# Patient Record
Sex: MALE | Race: WHITE | NOT HISPANIC OR LATINO | Employment: FULL TIME | URBAN - METROPOLITAN AREA
[De-identification: names, ages, dates, MRNs, and addresses within clinical notes are randomized per-mention and may not be internally consistent; named-entity substitution may affect disease eponyms.]

---

## 2017-03-08 ENCOUNTER — OFFICE VISIT (OUTPATIENT)
Dept: FAMILY MEDICINE | Facility: CLINIC | Age: 27
End: 2017-03-08
Payer: COMMERCIAL

## 2017-03-08 VITALS
DIASTOLIC BLOOD PRESSURE: 76 MMHG | SYSTOLIC BLOOD PRESSURE: 106 MMHG | BODY MASS INDEX: 37.47 KG/M2 | HEIGHT: 74 IN | WEIGHT: 292 LBS | HEART RATE: 60 BPM | TEMPERATURE: 98.7 F

## 2017-03-08 DIAGNOSIS — Z72.0 TOBACCO ABUSE: ICD-10-CM

## 2017-03-08 DIAGNOSIS — Z86.59 HISTORY OF DEPRESSION: ICD-10-CM

## 2017-03-08 DIAGNOSIS — K13.79 MASS OF MOUTH: ICD-10-CM

## 2017-03-08 DIAGNOSIS — E66.9 NON MORBID OBESITY, UNSPECIFIED OBESITY TYPE: ICD-10-CM

## 2017-03-08 DIAGNOSIS — Z00.01 ENCOUNTER FOR ROUTINE ADULT MEDICAL EXAM WITH ABNORMAL FINDINGS: Primary | ICD-10-CM

## 2017-03-08 PROCEDURE — 99395 PREV VISIT EST AGE 18-39: CPT | Performed by: PHYSICIAN ASSISTANT

## 2017-03-08 NOTE — PATIENT INSTRUCTIONS
Return for fasting labs when you are able.  Call to schedule an appointment with Oral Surgeon for you mouth mass.  Think about Wellbutrin/Zyban for smoking cessation.  Work on a healthy diet and regular exercise to bring your weight down and decrease your risk of developing diabetes.    CHRISTI Voss, PA-C    Preventive Health Recommendations  Male Ages 18 - 25     Yearly exam:             See your health care provider every year in order to  o   Review health changes.   o   Discuss preventive care.    o   Review your medicines if your doctor has prescribed any.    You should be tested each year for STDs (sexually transmitted diseases).     Talk to your provider about cholesterol testing.      If you are at risk for diabetes, you should have a diabetes test (fasting glucose).    Shots: Get a flu shot each year. Get a tetanus shot every 10 years.     Nutrition:    Eat at least 5 servings of fruits and vegetables daily.     Eat whole-grain bread, whole-wheat pasta and brown rice instead of white grains and rice.     Talk to your provider about calcium and Vitamin D.     Lifestyle    Exercise for at least 150 minutes a week (30 minutes a day, 5 days a week). This will help you control your weight and prevent disease.     Limit alcohol to one drink per day.     No smoking.     Wear sunscreen to prevent skin cancer.     See your dentist every six months for an exam and cleaning.

## 2017-03-08 NOTE — MR AVS SNAPSHOT
After Visit Summary   3/8/2017    Pierre Deleon    MRN: [id number]           Patient Information     Date Of Birth          1990        Visit Information        Provider Department      3/8/2017 11:20 AM Lidia Adorno PA-C [facility]        Today's Diagnoses     Encounter for routine adult medical exam with abnormal findings    -  1    History of depression        Tobacco abuse        Mass of mouth          Care Instructions    Return for fasting labs when you are able.  Call to schedule an appointment with Oral Surgeon for you mouth mass.  Think about Wellbutrin/Zyban for smoking cessation.  Work on a healthy diet and regular exercise to bring your weight down and decrease your risk of developing diabetes.    CHRISTI Voss PA-C    Preventive Health Recommendations  Male Ages 18 - 25     Yearly exam:             See your health care provider every year in order to  o   Review health changes.   o   Discuss preventive care.    o   Review your medicines if your doctor has prescribed any.    You should be tested each year for STDs (sexually transmitted diseases).     Talk to your provider about cholesterol testing.      If you are at risk for diabetes, you should have a diabetes test (fasting glucose).    Shots: Get a flu shot each year. Get a tetanus shot every 10 years.     Nutrition:    Eat at least 5 servings of fruits and vegetables daily.     Eat whole-grain bread, whole-wheat pasta and brown rice instead of white grains and rice.     Talk to your provider about calcium and Vitamin D.     Lifestyle    Exercise for at least 150 minutes a week (30 minutes a day, 5 days a week). This will help you control your weight and prevent disease.     Limit alcohol to one drink per day.     No smoking.     Wear sunscreen to prevent skin cancer.     See your dentist every six months for an exam and cleaning.           Follow-ups after your visit        Additional Services      ORAL SURGERY REFERRAL       Your provider has referred you to: [facility]  [phone], [facility] ([facility]) [phone], [facility]  [phone] and [facility] ([facility]) [phone]      Please be aware that coverage of these services is subject to the terms and limitations of your health insurance plan.  Call member services at your health plan with any benefit or coverage questions.      Please bring the following to your appointment:    >>   Any x-rays, CTs or MRIs which have been performed.  Contact the facility where they were done to arrange for  prior to your scheduled appointment.    >>   List of current medications   >>   This referral request   >>   Any documents/labs given to you for this referral                  Future tests that were ordered for you today     Open Future Orders        Priority Expected Expires Ordered    Lipid panel reflex to direct LDL Routine 4/8/2017 8/8/2017 3/8/2017    Glucose Routine 4/8/2017 8/8/2017 3/8/2017            Who to contact     If you have questions or need follow up information about today's clinic visit or your schedule please contact [facility] directly at [phone].  Normal or non-critical lab and imaging results will be communicated to you by "[employer]hart, letter or phone within 4 business days after the clinic has received the results. If you do not hear from us within 7 days, please contact the clinic through "[employer]hart or phone. If you have a critical or abnormal lab result, we will notify you by phone as soon as possible.  Submit refill requests through [employer] or call your pharmacy and they will forward the refill request to us. Please allow 3 business days for your refill to be completed.          Additional Information About Your Visit        [employer] Information     [employer] gives you secure access to your electronic  "health record. If you see a primary care provider, you can also send messages to your care team and make appointments. If you have questions, please call your primary care clinic.  If you do not have a primary care provider, please call [phone] and they will assist you.        Care EveryWhere ID     This is your Care EveryWhere ID. This could be used by other organizations to access your [place] medical records  [id number]        Your Vitals Were     Pulse Temperature Height BMI (Body Mass Index)          60 98.7  F (37.1  C) (Oral) 6' 1.75\" (1.873 m) 37.75 kg/m2         Blood Pressure from Last 3 Encounters:   03/08/17 106/76   11/09/15 122/70   06/18/15 124/66    Weight from Last 3 Encounters:   03/08/17 292 lb (132.5 kg)   11/09/15 271 lb (122.9 kg)   06/18/15 273 lb (123.8 kg)              We Performed the Following     DEPRESSION ACTION PLAN (DAP) Order [[id number]]     ORAL SURGERY REFERRAL        Primary Care Provider Office Phone # Fax #    Dinh Lopez PA-C [phone]       [address] [address]        Thank you!     Thank you for choosing [facility]  for your care. Our goal is always to provide you with excellent care. Hearing back from our patients is one way we can continue to improve our services. Please take a few minutes to complete the written survey that you may receive in the mail after your visit with us. Thank you!             Your Updated Medication List - Protect others around you: Learn how to safely use, store and throw away your medicines at www.disposemymeds.org.      Notice  As of 3/8/2017 11:52 AM    You have not been prescribed any medications.      "

## 2017-03-08 NOTE — NURSING NOTE
"Chief Complaint   Patient presents with     Physical       Initial /76 (Cuff Size: Adult Large)  Pulse 60  Temp 98.7  F (37.1  C) (Oral)  Ht 6' 1.75\" (1.873 m)  Wt 292 lb (132.5 kg)  BMI 37.75 kg/m2 Estimated body mass index is 37.75 kg/(m^2) as calculated from the following:    Height as of this encounter: 6' 1.75\" (1.873 m).    Weight as of this encounter: 292 lb (132.5 kg).  Medication Reconciliation: complete     Marlys Jay, Certified Medical Assistant (AAMA)     "

## 2017-03-08 NOTE — PROGRESS NOTES
SUBJECTIVE:     CC: Pierre Deleon is an 26 year old male who presents for preventative health visit.     Healthy Habits:    Do you get at least three servings of calcium containing foods daily (dairy, green leafy vegetables, etc.)? no, taking calcium and/or vitamin D supplement: no    Amount of exercise or daily activities, outside of work: none    Problems taking medications regularly not applicable    Medication side effects: No    Have you had an eye exam in the past two years? yes    Do you see a dentist twice per year? no    Do you have sleep apnea, excessive snoring or daytime drowsiness?yes    Lump on right side of upper lip.  Has had it for 4-5 years.  Has increased in size. Not painful, just there. Was referred to oral surgery 3-4 years ago, but didn't realize that he was supposed to call to schedule this.    History of depression, no recent issues.  -------------------------------------    Today's PHQ-2 Score:   PHQ-2 ( 1999 [employer]) 3/8/2017 3/8/2013   Q1: Little interest or pleasure in doing things 0 2   Q2: Feeling down, depressed or hopeless 0 0   PHQ-2 Score 0 2       Abuse: Current or Past(Physical, Sexual or Emotional)- No  Do you feel safe in your environment - Yes    Social History   Substance Use Topics     Smoking status: Current Every Day Smoker     Packs/day: 1.00     Types: Cigarettes     Smokeless tobacco: Never Used     Alcohol use No     doesn't drink    Last PSA: No results found for: PSA    Recent Labs   Lab Test  08/01/12   1046   CHOL  138   HDL  29*   LDL  80   TRIG  145   CHOLHDLRATIO  4.8       Reviewed orders with patient. Reviewed health maintenance and updated orders accordingly - Yes    Reviewed and updated as needed this visit by clinical staff  Tobacco  Allergies  Med Hx  Surg Hx  Fam Hx  Soc Hx        Reviewed and updated as needed this visit by Provider            ROS:  C: NEGATIVE for fever, chills, change in weight  I: NEGATIVE for worrisome rashes, moles or  "lesions  E: NEGATIVE for vision changes or irritation  ENT: as above  R: NEGATIVE for significant cough or SOB  CV: NEGATIVE for chest pain, palpitations or peripheral edema  GI: NEGATIVE for nausea, abdominal pain, heartburn, or change in bowel habits   male: negative for dysuria, hematuria, decreased urinary stream, erectile dysfunction, urethral discharge  M: NEGATIVE for significant arthralgias or myalgia  N: NEGATIVE for weakness, dizziness or paresthesias  P: NEGATIVE for changes in mood or affect    Problem list, Medication list, Allergies, and Medical/Social/Surgical histories reviewed in [facility] and updated as appropriate.  OBJECTIVE:     /76 (Cuff Size: Adult Large)  Pulse 60  Temp 98.7  F (37.1  C) (Oral)  Ht 6' 1.75\" (1.873 m)  Wt 292 lb (132.5 kg)  BMI 37.75 kg/m2  EXAM:  GENERAL: healthy, alert and no distress  EYES: Eyes grossly normal to inspection, PERRL and conjunctivae and sclerae normal  HENT: normal cephalic/atraumatic, ear canals and TM's normal, oropharynx clear, oral mucous membranes moist.  R upper lip, below nose with firm, circular 3-4 cm mass. This appears vascular in nature on gum side of mouth.  NECK: no adenopathy, no asymmetry, masses, or scars and thyroid normal to palpation  RESP: lungs clear to auscultation - no rales, rhonchi or wheezes  CV: regular rate and rhythm, normal S1 S2, no S3 or S4, no murmur, click or rub, no peripheral edema and peripheral pulses strong  ABDOMEN: soft, nontender, no hepatosplenomegaly, no masses and bowel sounds normal  MS: no gross musculoskeletal defects noted, no edema  SKIN: no suspicious lesions or rashes  NEURO: Normal strength and tone, mentation intact and speech normal  PSYCH: mentation appears normal, affect normal/bright    ASSESSMENT/PLAN:     1. Encounter for routine adult medical exam with abnormal findings  Follow up pending above results. Encouraged healthy diet and regular exercise, monthy JHONATAN, and yearly exams.  Strongly " "encouraged decrease in caffeine and energy drink consumption.  - Lipid panel reflex to direct LDL; Future  - Glucose; Future    2. History of depression  Stable, no new concerns.  - DEPRESSION ACTION PLAN (DAP) Order [[id number]]    3. Tobacco abuse  Encouraged smoking cessation, pt not interested at this time.    4. Mass of mouth  Increasing in size, pt is a smoker.  Strongly encouraged pt to call and schedule an appt for biopsy and/removal.  - ORAL SURGERY REFERRAL    5. Non-morbid obesity, unspecified obesity type  Recommended healthy diet and lifestyle changes.  Discussed options of nutrition referral, Lifestyle Program, Nutritional Weight and Wellness, Weight Watchers, Floored Pal, etc.    COUNSELING:  Reviewed preventive health counseling, as reflected in patient instructions         reports that he has been smoking Cigarettes.  He has been smoking about 1.00 pack per day. He has never used smokeless tobacco.    Estimated body mass index is 37.75 kg/(m^2) as calculated from the following:    Height as of this encounter: 6' 1.75\" (1.873 m).    Weight as of this encounter: 292 lb (132.5 kg).   Weight management plan: Discussed healthy diet and exercise guidelines and patient will follow up in 6 months in clinic to re-evaluate.    Counseling Resources:  ATP IV Guidelines  Pooled Cohorts Equation Calculator  FRAX Risk Assessment  ICSI Preventive Guidelines  Dietary Guidelines for Americans, 2010  USDA's MyPlate  ASA Prophylaxis  Lung CA Screening    Lidia Adorno PA-C  [facility]  "

## 2018-05-06 ENCOUNTER — HOSPITAL ENCOUNTER (EMERGENCY)
Facility: CLINIC | Age: 28
Discharge: HOME | End: 2018-05-06
Attending: NURSE PRACTITIONER | Admitting: NURSE PRACTITIONER
Payer: COMMERCIAL

## 2018-05-06 VITALS
SYSTOLIC BLOOD PRESSURE: 119 MMHG | BODY MASS INDEX: 31.02 KG/M2 | TEMPERATURE: 98 F | OXYGEN SATURATION: 99 % | RESPIRATION RATE: 16 BRPM | HEART RATE: 72 BPM | DIASTOLIC BLOOD PRESSURE: 69 MMHG | WEIGHT: 240 LBS

## 2018-05-06 DIAGNOSIS — L08.9 TOE INFECTION: ICD-10-CM

## 2018-05-06 DIAGNOSIS — L84 CALLUS OF FOOT: ICD-10-CM

## 2018-05-06 PROCEDURE — 99213 OFFICE O/P EST LOW 20 MIN: CPT | Mod: Z6 | Performed by: NURSE PRACTITIONER

## 2018-05-06 PROCEDURE — G0463 HOSPITAL OUTPT CLINIC VISIT: HCPCS | Performed by: NURSE PRACTITIONER

## 2018-05-06 RX ORDER — CEPHALEXIN 500 MG/1
500 CAPSULE ORAL 4 TIMES DAILY
Qty: 28 CAPSULE | Refills: 0 | Status: SHIPPED | OUTPATIENT
Start: 2018-05-06 | End: 2018-05-06

## 2018-05-06 RX ORDER — CEPHALEXIN 500 MG/1
500 CAPSULE ORAL 4 TIMES DAILY
Qty: 28 CAPSULE | Refills: 0 | Status: SHIPPED | OUTPATIENT
Start: 2018-05-06 | End: 2018-10-03

## 2018-05-06 NOTE — ED AVS SNAPSHOT
[facility] Emergency Department    [address] [address]    Phone:  [phone]    Fax:  [phone]                                       Pierre Deleon   MRN: [id number]    Department:  [facility] Emergency Department   Date of Visit:  5/6/2018           Patient Information     Date Of Birth          1990        Your diagnoses for this visit were:     Toe infection     Callus of foot        You were seen by Deysi Aguirre APRN CNP.      Follow-up Information     Schedule an appointment as soon as possible for a visit with [facility].    Specialty:  Podiatry    Contact information:    [address]  [address]  [facility] [address] [phone]        Discharge Instructions       Keflex 500 mg 4 times a day for 7 days.  Follow-up with podiatry.     24 Hour Appointment Hotline       To make an appointment at any [place] clinic, call [phone] ([phone]). If you don't have a family doctor or clinic, we will help you find one. [place] clinics are conveniently located to serve the needs of you and your family.             Review of your medicines      START taking        Dose / Directions Last dose taken    cephALEXin 500 MG capsule   Commonly known as:  KEFLEX   Dose:  500 mg   Quantity:  28 capsule        Take 1 capsule (500 mg) by mouth 4 times daily for 7 days   Refills:  0                Prescriptions were sent or printed at these locations (1 Prescription)                   [facility] [address] [place] [address] [address] [address] [facility] [address] [facility]   [address] [address]    Telephone:  [phone]   Fax:  [phone]   Hours:                  E-Prescribed (1 of 1)         cephALEXin (KEFLEX) 500 MG capsule                Orders Needing Specimen Collection     None      Pending Results     No orders found from 5/4/2018 to 5/7/2018.            Pending Culture Results     No orders found from 5/4/2018  to 5/7/2018.            Pending Results Instructions     If you had any lab results that were not finalized at the time of your Discharge, you can call the ED Lab Result RN at [phone]. You will be contacted by this team for any positive Lab results or changes in treatment. The nurses are available 7 days a week from 10A to 6:30P.  You can leave a message 24 hours per day and they will return your call.        Test Results From Your Hospital Stay               Thank you for choosing [facility]       Thank you for choosing [facility] for your care. Our goal is always to provide you with excellent care. Hearing back from our patients is one way we can continue to improve our services. Please take a few minutes to complete the written survey that you may receive in the mail after you visit with us. Thank you!        [employer] Information     [employer] gives you secure access to your electronic health record. If you see a primary care provider, you can also send messages to your care team and make appointments. If you have questions, please call your primary care clinic.  If you do not have a primary care provider, please call [phone] and they will assist you.        Care EveryWhere ID     This is your Care EveryWhere ID. This could be used by other organizations to access your [place] medical records  [id number]        Equal Access to Services     BENEDICTO HUNT : Lynne Petersen, aaliyah boone, qashawna kavinicio watt, madeline viveros. So [facility] [phone].    ATENCIÓN: Si habla español, tiene a harris disposición servicios gratuitos de asistencia lingüística. Llame al [phone].    We comply with applicable federal civil rights laws and [place] laws. We do not discriminate on the basis of race, color, national origin, age, disability, sex, sexual orientation, or gender identity.            After Visit Summary       This is your record. Keep this with you and show to your  community pharmacist(s) and doctor(s) at your next visit.

## 2018-05-06 NOTE — ED AVS SNAPSHOT
[facility] Emergency Department    [address] [address]    Phone:  [phone]    Fax:  [phone]                                       Pierre Deleon   MRN: [id number]    Department:  [facility] Emergency Department   Date of Visit:  5/6/2018           After Visit Summary Signature Page     I have received my discharge instructions, and my questions have been answered. I have discussed any challenges I see with this plan with the nurse or doctor.    ..........................................................................................................................................  Patient/Patient Representative Signature      ..........................................................................................................................................  Patient Representative Print Name and Relationship to Patient    ..................................................               ................................................  Date                                            Time    ..........................................................................................................................................  Reviewed by Signature/Title    ...................................................              ..............................................  Date                                                            Time

## 2018-05-06 NOTE — ED PROVIDER NOTES
History     Chief Complaint   Patient presents with     Foot Pain     R foot sore     HPI  Pierre Deleon is a 28 year old male Who presents to urgent care for evaluation of a callus on his left great toe.  Over the last 2 days the toe has been more sore and red.  Denies fever.  Denies any history of gout.    Problem List:    Patient Active Problem List    Diagnosis Date Noted     Tobacco abuse 03/08/2017     Priority: Medium     Non morbid obesity, unspecified obesity type 03/08/2017     Priority: Medium     History of depression 01/20/2015     Priority: Medium     CARDIOVASCULAR SCREENING; LDL GOAL LESS THAN 160      Priority: Medium     Cigarette smoker      Priority: Medium        Past Medical History:    Past Medical History:   Diagnosis Date     CARDIOVASCULAR SCREENING; LDL GOAL LESS THAN 160      Cigarette smoker      Depression        Past Surgical History:    Past Surgical History:   Procedure Laterality Date     NO HISTORY OF SURGERY         Family History:    Family History   Problem Relation Age of Onset     DIABETES Father      Coronary Artery Disease Father        Social History:  Marital Status:  Single [1]  Social History   Substance Use Topics     Smoking status: Current Every Day Smoker     Packs/day: 1.00     Types: Cigarettes     Smokeless tobacco: Never Used     Alcohol use No        Medications:      cephALEXin (KEFLEX) 500 MG capsule         Review of Systems  As mentioned above in the history present illness. All other systems were reviewed and are negative.    Physical Exam   BP: 119/69  Pulse: 72  Temp: 98  F (36.7  C)  Resp: 16  Weight: 108.9 kg (240 lb)  SpO2: 99 %      Physical Exam    GENERAL APPEARANCE: healthy, alert and no distress  SKIN: Medial aspect of the left great toe reveals a large corn/callus.  There is deep cracks in the callus.  There is faint erythema surrounding the callus.    ED Course     ED Course     Procedures               No results found for this or any previous  visit (from the past 24 hour(s)).    Medications - No data to display    Assessments & Plan (with Medical Decision Making)   Callus to the left great toe:  Follow-up with podiatry.    Warm soapy soaks and pummus stone.    Cellulitis probably early.  Start keflex   Return for worsening.  I have reviewed the nursing notes.    I have reviewed the findings, diagnosis, plan and need for follow up with the patient.      Discharge Medication List as of 5/6/2018  5:31 PM      START taking these medications    Details   cephALEXin (KEFLEX) 500 MG capsule Take 1 capsule (500 mg) by mouth 4 times daily for 7 days, Disp-28 capsule, R-0, E-Prescribe             Final diagnoses:   Toe infection   Callus of foot       5/6/2018   [facility] EMERGENCY DEPARTMENT     Deysi Aguirre APRN CNP  05/06/18 9922

## 2018-05-06 NOTE — ED NOTES
Pt here with right great toe pain- noticed a laceration 1 month ago. Pain became worse today. Callused area on great toe with several cracks noted.

## 2018-05-17 ENCOUNTER — OFFICE VISIT (OUTPATIENT)
Dept: PODIATRY | Facility: CLINIC | Age: 28
End: 2018-05-17
Payer: COMMERCIAL

## 2018-05-17 VITALS
BODY MASS INDEX: 30.8 KG/M2 | DIASTOLIC BLOOD PRESSURE: 60 MMHG | WEIGHT: 240 LBS | HEART RATE: 73 BPM | SYSTOLIC BLOOD PRESSURE: 119 MMHG | HEIGHT: 74 IN

## 2018-05-17 DIAGNOSIS — R23.4 FISSURE IN SKIN OF FOOT: Primary | ICD-10-CM

## 2018-05-17 PROCEDURE — 99203 OFFICE O/P NEW LOW 30 MIN: CPT | Mod: 25 | Performed by: PODIATRIST

## 2018-05-17 PROCEDURE — 11055 PARING/CUTG B9 HYPRKER LES 1: CPT | Performed by: PODIATRIST

## 2018-05-17 NOTE — PROGRESS NOTES
"PATIENT HISTORY:  Pierre Deleon is a 28 year old male who presents to clinic for a painful fissure in the skin of the right great toe.  The patient relates trying to trim the hard skin with little relief.  The patient denies any redness swelling or drainage.    REVIEW OF SYSTEMS:  Constitutional, HEENT, cardiovascular, pulmonary, GI, , musculoskeletal, neuro, skin, endocrine and psych systems are negative, except as otherwise noted.     PAST MEDICAL HISTORY:   Past Medical History:   Diagnosis Date     CARDIOVASCULAR SCREENING; LDL GOAL LESS THAN 160      Cigarette smoker      Depression         PAST SURGICAL HISTORY:   Past Surgical History:   Procedure Laterality Date     NO HISTORY OF SURGERY          MEDICATIONS:   Current Outpatient Prescriptions:      cephALEXin (KEFLEX) 500 MG capsule, Take 1 capsule (500 mg) by mouth 4 times daily (Patient not taking: Reported on 5/17/2018), Disp: 28 capsule, Rfl: 0     ALLERGIES:  No Known Allergies     SOCIAL HISTORY:   Social History     Social History     Marital status: Single     Spouse name: N/A     Number of children: N/A     Years of education: N/A     Occupational History     Not on file.     Social History Main Topics     Smoking status: Current Every Day Smoker     Packs/day: 1.00     Types: Cigarettes     Smokeless tobacco: Never Used     Alcohol use No     Drug use: No     Sexual activity: Yes     Partners: Female     Other Topics Concern     Parent/Sibling W/ Cabg, Mi Or Angioplasty Before 65f 55m? No     unknown     Social History Narrative        FAMILY HISTORY:   Family History   Problem Relation Age of Onset     DIABETES Father      Coronary Artery Disease Father         EXAM:Vitals: /60 (BP Location: Left arm, Patient Position: Sitting, Cuff Size: Adult Regular)  Pulse 73  Ht 6' 1.75\" (1.873 m)  Wt 240 lb (108.9 kg)  BMI 31.02 kg/m2  BMI= Body mass index is 31.02 kg/(m^2).    Weight management plan: Patient was referred to their PCP to discuss " a diet and exercise plan.    General appearance: Patient is alert and fully cooperative with history & exam.  No sign of distress is noted during the visit.     Psychiatric: Affect is pleasant & appropriate.  Patient appears motivated to improve health.     Respiratory: Breathing is regular & unlabored while sitting.     HEENT: Hearing is intact to spoken word.  Speech is clear.  No gross evidence of visual impairment that would impact ambulation.     Dermatologic: Skin is intact to both lower extremities without significant lesions, rash or abrasion.  No paronychia or evidence of soft tissue infection is noted.  One notes a hyperkeratotic skin fissure over the plantar aspect of the great toe on the right foot.  No surrounding erythema noted.  No drainage noted.     Vascular: DP & PT pulses are intact & regular bilaterally.  No significant edema or varicosities noted.  CFT and skin temperature is normal to both lower extremities.     Neurologic: Lower extremity sensation is intact to light touch.  No evidence of weakness or contracture in the lower extremities.  No evidence of neuropathy.     Musculoskeletal: Patient is ambulatory without assistive device or brace.  No gross ankle deformity noted.  No foot or ankle joint effusion is noted.         ASSESSMENT / PLAN:     ICD-10-CM    1. Fissure in skin of foot R23.4 TRIM HYPERKERATOTIC SKIN LESION, ONE    right great toe       I have explained to Pierre  about the conditions.  We discussed the nature of the condition as well as the treatment plan and expected length of recovery.  At this time, the hyperkeratotic skin lesion was sharply debrided with a #10 blade down to healthy epidermis.  No bleeding noted.  The patient was instructed to wear a silicone toe protector to better offload the pressure causing the fissure in the skin.      Disclaimer: This note consists of symbols derived from keyboarding, dictation and/or voice recognition software. As a result, there  may be errors in the script that have gone undetected. Please consider this when interpreting information found in this chart.       MATTEO Roberson D.P.M., LISA.[email or web address].S.

## 2018-05-17 NOTE — MR AVS SNAPSHOT
After Visit Summary   5/17/2018    Pierre Deleon    MRN: [id number]           Patient Information     Date Of Birth          1990        Visit Information        Provider Department      5/17/2018 4:00 PM Cem Roberson DPM [facility]        Care Instructions    CALLUS / CORNS / IPKs  When there is excessive friction or pressure on the skin, the body responds by making the skin thicker to protect the deeper structures from becoming exposed. While this works well to protect the deeper structures, the thickened skin can increase pressure and pain.    CALLUS: Flat, diffuse thickening are simple calluses and they are usually caused by friction. Often these are the result of rubbing on a shoe or going barefoot.    CORNS: Calluses with a central core between the toes are called corns. These result from prominent joints on adjacent toes rubbing together. Theses are a symptom of bone malalignment and will always recur unless the underlying bones are addressed surgically.    IPKs: Calluses with a central core on the ball of the foot are usually IPKs (intractable plantar keratosis). These are caused by excessive pressure from the metatarsals, the bones that make up the ball of the foot. Often one of these bones is too long or too prominent.  Again, these will always recur unless the underlying bone issue is addressed. There is no cure for these. They will either go away by themselves, recur, or more could develop.    ROUTINE MAINTENANCE  1. File them down with a pumice stone or callus file a couple times a week.   2. An electric callus removing device. Amope Pedi Perfect Electronic Pedicure Foot File and Callus Remover can be a good option.   3. Lotion can be applied to soften the callus. A urea based cream such as Kersal or Vanicream or thicker cream with shea butter are good options.  4. Toe spacers or toe covers can be used for corns, gel pads can be used for other  "lesions on the bottom of the foot.   If there is a surgical pathology noted, such as a prominent bone, often this needs to be addressed surgically to minimize recurrence. However, sometimes the lesion simply migrates to another spot after surgery, so it is not a guaranteed cure.                   Follow-ups after your visit        Who to contact     If you have questions or need follow up information about today's clinic visit or your schedule please contact [facility] directly at [phone].  Normal or non-critical lab and imaging results will be communicated to you by BullGuardhart, letter or phone within 4 business days after the clinic has received the results. If you do not hear from us within 7 days, please contact the clinic through sourceasyt or phone. If you have a critical or abnormal lab result, we will notify you by phone as soon as possible.  Submit refill requests through Mytopia or call your pharmacy and they will forward the refill request to us. Please allow 3 business days for your refill to be completed.          Additional Information About Your Visit        Mytopia Information     Mytopia gives you secure access to your electronic health record. If you see a primary care provider, you can also send messages to your care team and make appointments. If you have questions, please call your primary care clinic.  If you do not have a primary care provider, please call [phone] and they will assist you.        Care EveryWhere ID     This is your Care EveryWhere ID. This could be used by other organizations to access your [place] medical records  [id number]        Your Vitals Were     Pulse Height BMI (Body Mass Index)             73 6' 1.75\" (1.873 m) 31.02 kg/m2          Blood Pressure from Last 3 Encounters:   05/17/18 119/60   05/06/18 119/69   03/08/17 106/76    Weight from Last 3 Encounters:   05/17/18 240 lb (108.9 kg)   05/06/18 240 lb (108.9 kg)   03/08/17 292 " lb (132.5 kg)              Today, you had the following     No orders found for display       Primary Care Provider Office Phone # Fax #    Dinh Lopez PA-C [phone]       [facility] [address]        Equal Access to Services     BENEDICTO HUNT : Hadii aad ku hadasho Soomaali, waaxda luqadaha, qaybta kaalmada adeegyada, waxay idiin hayaan adeeg kharash la'vincentn ah. So [facility] [phone].    ATENCIÓN: Si habla español, tiene a harris disposición servicios gratuitos de asistencia lingüística. aCrlaame al [phone].    We comply with applicable federal civil rights laws and [place] laws. We do not discriminate on the basis of race, color, national origin, age, disability, sex, sexual orientation, or gender identity.            Thank you!     Thank you for choosing [facility]  for your care. Our goal is always to provide you with excellent care. Hearing back from our patients is one way we can continue to improve our services. Please take a few minutes to complete the written survey that you may receive in the mail after your visit with us. Thank you!             Your Updated Medication List - Protect others around you: Learn how to safely use, store and throw away your medicines at www.disposemymeds.org.          This list is accurate as of 5/17/18  4:13 PM.  Always use your most recent med list.                   Brand Name Dispense Instructions for use Diagnosis    cephALEXin 500 MG capsule    KEFLEX    28 capsule    Take 1 capsule (500 mg) by mouth 4 times daily

## 2018-05-17 NOTE — LETTER
5/17/2018         RE: Pierre Deleon  [address] [address]        Dear Colleague,    Thank you for referring your patient, Pierre Deleon, to the [facility]. Please see a copy of my visit note below.    PATIENT HISTORY:  Pierre Deleon is a 28 year old male who presents to clinic for a painful fissure in the skin of the right great toe.  The patient relates trying to trim the hard skin with little relief.  The patient denies any redness swelling or drainage.    REVIEW OF SYSTEMS:  Constitutional, HEENT, cardiovascular, pulmonary, GI, , musculoskeletal, neuro, skin, endocrine and psych systems are negative, except as otherwise noted.     PAST MEDICAL HISTORY:   Past Medical History:   Diagnosis Date     CARDIOVASCULAR SCREENING; LDL GOAL LESS THAN 160      Cigarette smoker      Depression         PAST SURGICAL HISTORY:   Past Surgical History:   Procedure Laterality Date     NO HISTORY OF SURGERY          MEDICATIONS:   Current Outpatient Prescriptions:      cephALEXin (KEFLEX) 500 MG capsule, Take 1 capsule (500 mg) by mouth 4 times daily (Patient not taking: Reported on 5/17/2018), Disp: 28 capsule, Rfl: 0     ALLERGIES:  No Known Allergies     SOCIAL HISTORY:   Social History     Social History     Marital status: Single     Spouse name: N/A     Number of children: N/A     Years of education: N/A     Occupational History     Not on file.     Social History Main Topics     Smoking status: Current Every Day Smoker     Packs/day: 1.00     Types: Cigarettes     Smokeless tobacco: Never Used     Alcohol use No     Drug use: No     Sexual activity: Yes     Partners: Female     Other Topics Concern     Parent/Sibling W/ Cabg, Mi Or Angioplasty Before 65f 55m? No     unknown     Social History Narrative        FAMILY HISTORY:   Family History   Problem Relation Age of Onset     DIABETES Father      Coronary Artery Disease Father         EXAM:Vitals: /60 (BP Location:  "Left arm, Patient Position: Sitting, Cuff Size: Adult Regular)  Pulse 73  Ht 6' 1.75\" (1.873 m)  Wt 240 lb (108.9 kg)  BMI 31.02 kg/m2  BMI= Body mass index is 31.02 kg/(m^2).    Weight management plan: Patient was referred to their PCP to discuss a diet and exercise plan.    General appearance: Patient is alert and fully cooperative with history & exam.  No sign of distress is noted during the visit.     Psychiatric: Affect is pleasant & appropriate.  Patient appears motivated to improve health.     Respiratory: Breathing is regular & unlabored while sitting.     HEENT: Hearing is intact to spoken word.  Speech is clear.  No gross evidence of visual impairment that would impact ambulation.     Dermatologic: Skin is intact to both lower extremities without significant lesions, rash or abrasion.  No paronychia or evidence of soft tissue infection is noted.  One notes a hyperkeratotic skin fissure over the plantar aspect of the great toe on the right foot.  No surrounding erythema noted.  No drainage noted.     Vascular: DP & PT pulses are intact & regular bilaterally.  No significant edema or varicosities noted.  CFT and skin temperature is normal to both lower extremities.     Neurologic: Lower extremity sensation is intact to light touch.  No evidence of weakness or contracture in the lower extremities.  No evidence of neuropathy.     Musculoskeletal: Patient is ambulatory without assistive device or brace.  No gross ankle deformity noted.  No foot or ankle joint effusion is noted.         ASSESSMENT / PLAN:     ICD-10-CM    1. Fissure in skin of foot R23.4 TRIM HYPERKERATOTIC SKIN LESION, ONE    right great toe       I have explained to Pierre  about the conditions.  We discussed the nature of the condition as well as the treatment plan and expected length of recovery.  At this time, the hyperkeratotic skin lesion was sharply debrided with a #10 blade down to healthy epidermis.  No bleeding noted.  The patient " was instructed to wear a silicone toe protector to better offload the pressure causing the fissure in the skin.      Disclaimer: This note consists of symbols derived from keyboarding, dictation and/or voice recognition software. As a result, there may be errors in the script that have gone undetected. Please consider this when interpreting information found in this chart.       MATTEO Roberson D.P.M., F.NADINE.C.F.A.S.        Again, thank you for allowing me to participate in the care of your patient.        Sincerely,        Cem Roberson DPM

## 2018-10-03 ENCOUNTER — HOSPITAL ENCOUNTER (EMERGENCY)
Facility: CLINIC | Age: 28
Discharge: HOME | End: 2018-10-03
Attending: NURSE PRACTITIONER | Admitting: NURSE PRACTITIONER
Payer: COMMERCIAL

## 2018-10-03 VITALS
WEIGHT: 240 LBS | HEIGHT: 74 IN | DIASTOLIC BLOOD PRESSURE: 67 MMHG | SYSTOLIC BLOOD PRESSURE: 108 MMHG | BODY MASS INDEX: 30.8 KG/M2 | OXYGEN SATURATION: 100 % | TEMPERATURE: 98 F

## 2018-10-03 DIAGNOSIS — J02.0 ACUTE STREPTOCOCCAL PHARYNGITIS: Primary | ICD-10-CM

## 2018-10-03 LAB
INTERNAL QC OK POCT: YES
S PYO AG THROAT QL IA.RAPID: POSITIVE

## 2018-10-03 PROCEDURE — 87880 STREP A ASSAY W/OPTIC: CPT | Performed by: NURSE PRACTITIONER

## 2018-10-03 PROCEDURE — 99213 OFFICE O/P EST LOW 20 MIN: CPT | Performed by: NURSE PRACTITIONER

## 2018-10-03 PROCEDURE — G0463 HOSPITAL OUTPT CLINIC VISIT: HCPCS

## 2018-10-03 RX ORDER — PENICILLIN V POTASSIUM 500 MG/1
500 TABLET, FILM COATED ORAL 2 TIMES DAILY
Qty: 20 TABLET | Refills: 0 | Status: SHIPPED | OUTPATIENT
Start: 2018-10-03 | End: 2018-10-13

## 2018-10-03 NOTE — ED AVS SNAPSHOT
[facility] Emergency Department    [address] [address]    Phone:  [phone]    Fax:  [phone]                                       Pierre Deleon   MRN: [id number]    Department:  [facility] Emergency Department   Date of Visit:  10/3/2018           After Visit Summary Signature Page     I have received my discharge instructions, and my questions have been answered. I have discussed any challenges I see with this plan with the nurse or doctor.    ..........................................................................................................................................  Patient/Patient Representative Signature      ..........................................................................................................................................  Patient Representative Print Name and Relationship to Patient    ..................................................               ................................................  Date                                   Time    ..........................................................................................................................................  Reviewed by Signature/Title    ...................................................              ..............................................  Date                                               Time          22EPIC Rev 08/18

## 2018-10-03 NOTE — LETTER
October 3, 2018      To Whom It May Concern:      Pierre Deleon was seen in our Emergency Department today, 10/03/18.  I expect his condition to improve over the next few days.  He may return to work/school when improved.    Sincerely,        ELADIA Wilson CNP

## 2018-10-03 NOTE — ED PROVIDER NOTES
"  History     Chief Complaint   Patient presents with     Pharyngitis     HPI  SUBJECTIVE: Pierre Deleon  is here today because of:Sore Throat  The patient has had symptoms of sore throat.   Onset of symptoms was 2 days ago. Course of illness is worsening.  Patient admits to exposure to illness at home or work/school.   Patient denies fever, nausea, vomiting, diarrhea, headache, facial pressure, sinus pain, sneezing, chest congestion, wheezing and myalgias  Treatment measures tried include nothing.  Patient is a smoker    Problem List:    Patient Active Problem List    Diagnosis Date Noted     Tobacco abuse 03/08/2017     Priority: Medium     Non morbid obesity, unspecified obesity type 03/08/2017     Priority: Medium     History of depression 01/20/2015     Priority: Medium     CARDIOVASCULAR SCREENING; LDL GOAL LESS THAN 160      Priority: Medium     Cigarette smoker      Priority: Medium        Past Medical History:    Past Medical History:   Diagnosis Date     CARDIOVASCULAR SCREENING; LDL GOAL LESS THAN 160      Cigarette smoker      Depression        Past Surgical History:    Past Surgical History:   Procedure Laterality Date     NO HISTORY OF SURGERY         Family History:    Family History   Problem Relation Age of Onset     Diabetes Father      Coronary Artery Disease Father        Social History:  Marital Status:  Single [1]  Social History   Substance Use Topics     Smoking status: Current Every Day Smoker     Packs/day: 1.00     Types: Cigarettes     Smokeless tobacco: Never Used     Alcohol use No        Medications:      penicillin V potassium (VEETID) 500 MG tablet       Review of Systems  As mentioned above in the history present illness. All other systems were reviewed and are negative.    Physical Exam   BP: 108/67  Heart Rate: 53  Temp: 98  F (36.7  C)  Height: 188 cm (6' 2\")  Weight: 108.9 kg (240 lb)  SpO2: 100 %      Physical Exam  GENERAL: alert and no acute distress  EYES:  Right conjunctiva " is not injected and without discharge.  Left conjunctiva is not injected and without discharge.  EARS: Right TM is normal: no effusions, no erythema, and normal landmarks.  Left TM is normal: no effusions, no erythema, and normal landmarks.  NOSE: Nasal mucosa is normal.  Sinus not tender.  THROAT: red/erythematous and exudate positive .  NECK: supple with enlarged lymph nodes in the anterior cervical area.  CARDIAC:NORMAL - regular rate and rhythm without murmur.  RESP: Normal - CTA without rales, rhonchi, or wheezing.  SKIN: normal      ED Course     ED Course     Procedures    Results for orders placed or performed during the hospital encounter of 10/03/18 (from the past 24 hour(s))   Rapid strep group A screen POCT   Result Value Ref Range    Rapid Strep A Screen positive neg    Internal QC OK Yes        Medications - No data to display    Assessments & Plan (with Medical Decision Making)     I have reviewed the nursing notes.    I have reviewed the findings, diagnosis, plan and need for follow up with the patient.  Medical Decision Making:  CXR is not indicated.  Rapid Strep test is indicated.     Assessment:  1) Strep pharyngitis.    PLAN:  Penicillin V 500 mg PO BID x 10 days  Use acetaminophen, ibuprofen, Cepacol lozenges, increase fluids and rest.   Follow up with any questions or problems    New Prescriptions    PENICILLIN V POTASSIUM (VEETID) 500 MG TABLET    Take 1 tablet (500 mg) by mouth 2 times daily for 10 days       Final diagnoses:   Acute streptococcal pharyngitis       10/3/2018   [facility] EMERGENCY DEPARTMENT     Yulia Caro, ELADIA CNP  10/03/18 2914

## 2018-10-03 NOTE — ED AVS SNAPSHOT
[facility]    [address] [place] [address]    Phone:  [phone]    Fax:  [phone]                                       Pierre Deleon   MRN: [id number]    Department:  [facility]   Date of Visit:  10/3/2018           Patient Information     Date Of Birth          1990        Your diagnoses for this visit were:     Acute streptococcal pharyngitis        You were seen by Yulia Caro APRN CNP.      Follow-up Information     Follow up with Dinh Lopez PA-C In 3 days.    Specialty:  Physician Assistant    Why:  If symptoms worsen, As needed    Contact information:    [address]  [place] [address]  [phone]          Discharge Instructions         Pharyngitis: Strep (Confirmed)    You have had a positive test for strep throat. Strep throat is a contagious illness. It is spread by coughing, kissing or by touching others after touching your mouth or nose. Symptoms include throat pain that is worse with swallowing, aching all over, headache, and fever. It is treated with antibiotic medicine. This should help you start to feel better in 1 to 2 days.  Home care    Rest at home. Drink plenty of fluids to you won't get dehydrated.    No work or school for the first 2 days of taking the antibiotics. After this time, you will not be contagious. You can then return to school or work if you are feeling better.     Take antibiotic medicine for the full 10 days, even if you feel better. This is very important to ensure the infection is treated. It is also important to prevent medicine-resistant germs from developing. If you were given an antibiotic shot, you don't need any more antibiotics.    You may use acetaminophen or ibuprofen to control pain or fever, unless another medicine was prescribed for this. Talk with your healthcare provider before taking these medicines if you have chronic liver or kidney disease. Also talk  with your healthcare provider if you have had a stomach ulcer or GI bleeding.    Throat lozenges or sprays help reduce pain. Gargling with warm saltwater will also reduce throat pain. Dissolve 1/2 teaspoon of salt in 1 glass of warm water. This may be useful just before meals.     Soft foods are OK. Don't eat salty or spicy foods.  Follow-up care  Follow up with your healthcare provider or our staff if you don't get better over the next week.  When to seek medical advice  Call your healthcare provider right away if any of these occur:    Fever of 100.4 F (38 C) or higher, or as directed by your healthcare provider    New or worsening ear pain, sinus pain, or headache    Painful lumps in the back of neck    Stiff neck    Lymph nodes getting larger or becoming soft in the middle    You can't swallow liquids or you can't open your mouth wide because of throat pain    Signs of dehydration. These include very dark urine or no urine, sunken eyes, and dizziness.    Trouble breathing or noisy breathing    Muffled voice    Rash  Prevention  Here are steps you can take to help prevent an infection:    Keep good hand washing habits.    Don t have close contact with people who have sore throats, colds, or other upper respiratory infections.    Don t smoke, and stay away from secondhand smoke.  Date Last Reviewed: 11/1/2017 2000-2017 [employer]. [address] [place], [place] [id number]. All rights reserved. This information is not intended as a substitute for professional medical care. Always follow your healthcare professional's instructions.          24 Hour Appointment Hotline       To make an appointment at any [facility] clinic, call [phone] ([phone]). If you don't have a family doctor or clinic, we will help you find one. [facility] clinics are conveniently located to serve the needs of you and your family.             Review of your medicines      START taking        Dose / Directions Last  dose taken    penicillin V potassium 500 MG tablet   Commonly known as:  VEETID   Dose:  500 mg   Quantity:  20 tablet        Take 1 tablet (500 mg) by mouth 2 times daily for 10 days   Refills:  0                Prescriptions were sent or printed at these locations (1 Prescription)                   [facility] [place] - [address]   [address], [place] [address]    Telephone:  [phone]   Fax:  [phone]   Hours:                  E-Prescribed (1 of 1)         penicillin V potassium (VEETID) 500 MG tablet                Procedures and tests performed during your visit     Rapid strep group A screen POCT      Orders Needing Specimen Collection     None      Pending Results     No orders found from 10/1/2018 to 10/4/2018.            Pending Culture Results     No orders found from 10/1/2018 to 10/4/2018.            Pending Results Instructions     If you had any lab results that were not finalized at the time of your Discharge, you can call the ED Lab Result RN at [phone]. You will be contacted by this team for any positive Lab results or changes in treatment. The nurses are available 7 days a week from 10A to 6:30P.  You can leave a message 24 hours per day and they will return your call.        Test Results From Your Hospital Stay        10/3/2018  1:46 PM      Component Results     Component Value Ref Range & Units Status    Rapid Strep A Screen positive neg Final    Internal QC OK Yes  Final                Thank you for choosing [facility]       Thank you for choosing [facility] for your care. Our goal is always to provide you with excellent care. Hearing back from our patients is one way we can continue to improve our services. Please take a few minutes to complete the written survey that you may receive in the mail after you visit with us. Thank you!        [employer] Information     [employer] gives you secure access to your electronic health record. If you see a primary  care provider, you can also send messages to your care team and make appointments. If you have questions, please call your primary care clinic.  If you do not have a primary care provider, please call [phone] and they will assist you.        Care EveryWhere ID     This is your Care EveryWhere ID. This could be used by other organizations to access your [facility] medical records  [id number]        Equal Access to Services     BENEDICTO HUNT : Lynne Petersen, aaliyah boone, diane watt, madeline viveros. So [facility] [phone].    ATENCIÓN: Si habla español, tiene a harris disposición servicios gratuitos de asistencia lingüística. Lukas al [phone].    We comply with applicable federal civil rights laws and [place] laws. We do not discriminate on the basis of race, color, national origin, age, disability, sex, sexual orientation, or gender identity.            After Visit Summary       This is your record. Keep this with you and show to your community pharmacist(s) and doctor(s) at your next visit.

## 2018-10-03 NOTE — DISCHARGE INSTRUCTIONS
Pharyngitis: Strep (Confirmed)    You have had a positive test for strep throat. Strep throat is a contagious illness. It is spread by coughing, kissing or by touching others after touching your mouth or nose. Symptoms include throat pain that is worse with swallowing, aching all over, headache, and fever. It is treated with antibiotic medicine. This should help you start to feel better in 1 to 2 days.  Home care    Rest at home. Drink plenty of fluids to you won't get dehydrated.    No work or school for the first 2 days of taking the antibiotics. After this time, you will not be contagious. You can then return to school or work if you are feeling better.     Take antibiotic medicine for the full 10 days, even if you feel better. This is very important to ensure the infection is treated. It is also important to prevent medicine-resistant germs from developing. If you were given an antibiotic shot, you don't need any more antibiotics.    You may use acetaminophen or ibuprofen to control pain or fever, unless another medicine was prescribed for this. Talk with your healthcare provider before taking these medicines if you have chronic liver or kidney disease. Also talk with your healthcare provider if you have had a stomach ulcer or GI bleeding.    Throat lozenges or sprays help reduce pain. Gargling with warm saltwater will also reduce throat pain. Dissolve 1/2 teaspoon of salt in 1 glass of warm water. This may be useful just before meals.     Soft foods are OK. Don't eat salty or spicy foods.  Follow-up care  Follow up with your healthcare provider or our staff if you don't get better over the next week.  When to seek medical advice  Call your healthcare provider right away if any of these occur:    Fever of 100.4 F (38 C) or higher, or as directed by your healthcare provider    New or worsening ear pain, sinus pain, or headache    Painful lumps in the back of neck    Stiff neck    Lymph nodes getting larger or  becoming soft in the middle    You can't swallow liquids or you can't open your mouth wide because of throat pain    Signs of dehydration. These include very dark urine or no urine, sunken eyes, and dizziness.    Trouble breathing or noisy breathing    Muffled voice    Rash  Prevention  Here are steps you can take to help prevent an infection:    Keep good hand washing habits.    Don t have close contact with people who have sore throats, colds, or other upper respiratory infections.    Don t smoke, and stay away from secondhand smoke.  Date Last Reviewed: 11/1/2017 2000-2017 [employer]. [address] [place] [address]. All rights reserved. This information is not intended as a substitute for professional medical care. Always follow your healthcare professional's instructions.

## 2019-12-19 ENCOUNTER — ALLIED HEALTH/NURSE VISIT (OUTPATIENT)
Dept: NURSING | Facility: CLINIC | Age: 29
End: 2019-12-19

## 2019-12-19 DIAGNOSIS — Z23 ENCOUNTER FOR IMMUNIZATION: Primary | ICD-10-CM

## 2019-12-19 PROCEDURE — 90686 IIV4 VACC NO PRSV 0.5 ML IM: CPT

## 2019-12-19 PROCEDURE — 99207 ZZC NO CHARGE NURSE ONLY: CPT

## 2019-12-19 PROCEDURE — 90471 IMMUNIZATION ADMIN: CPT

## 2020-02-16 ENCOUNTER — HEALTH MAINTENANCE LETTER (OUTPATIENT)
Age: 30
End: 2020-02-16

## 2020-11-22 ENCOUNTER — HEALTH MAINTENANCE LETTER (OUTPATIENT)
Age: 30
End: 2020-11-22

## 2021-04-04 ENCOUNTER — HEALTH MAINTENANCE LETTER (OUTPATIENT)
Age: 31
End: 2021-04-04

## 2021-09-19 ENCOUNTER — HEALTH MAINTENANCE LETTER (OUTPATIENT)
Age: 31
End: 2021-09-19

## 2021-10-21 ENCOUNTER — OFFICE VISIT (OUTPATIENT)
Dept: FAMILY MEDICINE | Facility: CLINIC | Age: 31
End: 2021-10-21
Payer: COMMERCIAL

## 2021-10-21 VITALS
HEIGHT: 74 IN | OXYGEN SATURATION: 98 % | HEART RATE: 83 BPM | WEIGHT: 315 LBS | RESPIRATION RATE: 24 BRPM | BODY MASS INDEX: 40.43 KG/M2 | TEMPERATURE: 98.4 F | SYSTOLIC BLOOD PRESSURE: 122 MMHG | DIASTOLIC BLOOD PRESSURE: 74 MMHG

## 2021-10-21 DIAGNOSIS — Z13.220 SCREENING FOR HYPERLIPIDEMIA: ICD-10-CM

## 2021-10-21 DIAGNOSIS — R06.83 SNORING: ICD-10-CM

## 2021-10-21 DIAGNOSIS — E66.01 MORBID OBESITY (H): ICD-10-CM

## 2021-10-21 DIAGNOSIS — Z00.00 ROUTINE GENERAL MEDICAL EXAMINATION AT A HEALTH CARE FACILITY: Primary | ICD-10-CM

## 2021-10-21 LAB — HBA1C MFR BLD: 5.5 % (ref 0–5.6)

## 2021-10-21 PROCEDURE — 99395 PREV VISIT EST AGE 18-39: CPT | Mod: 25 | Performed by: PHYSICIAN ASSISTANT

## 2021-10-21 PROCEDURE — 36415 COLL VENOUS BLD VENIPUNCTURE: CPT | Performed by: PHYSICIAN ASSISTANT

## 2021-10-21 PROCEDURE — 83036 HEMOGLOBIN GLYCOSYLATED A1C: CPT | Performed by: PHYSICIAN ASSISTANT

## 2021-10-21 PROCEDURE — 80061 LIPID PANEL: CPT | Performed by: PHYSICIAN ASSISTANT

## 2021-10-21 PROCEDURE — 90686 IIV4 VACC NO PRSV 0.5 ML IM: CPT | Performed by: PHYSICIAN ASSISTANT

## 2021-10-21 PROCEDURE — 90471 IMMUNIZATION ADMIN: CPT | Performed by: PHYSICIAN ASSISTANT

## 2021-10-21 PROCEDURE — 80048 BASIC METABOLIC PNL TOTAL CA: CPT | Performed by: PHYSICIAN ASSISTANT

## 2021-10-21 ASSESSMENT — ENCOUNTER SYMPTOMS
SORE THROAT: 0
HEMATOCHEZIA: 0
DYSURIA: 0
FREQUENCY: 0
HEADACHES: 0
COUGH: 0
JOINT SWELLING: 0
PALPITATIONS: 0
SHORTNESS OF BREATH: 0
ABDOMINAL PAIN: 0
NERVOUS/ANXIOUS: 0
DIARRHEA: 0
DIZZINESS: 0
WEAKNESS: 0
EYE PAIN: 0
MYALGIAS: 0
CONSTIPATION: 0
HEARTBURN: 0
PARESTHESIAS: 0
NAUSEA: 0
CHILLS: 0
FEVER: 0
ARTHRALGIAS: 0
HEMATURIA: 0

## 2021-10-21 ASSESSMENT — MIFFLIN-ST. JEOR: SCORE: 2561.14

## 2021-10-21 ASSESSMENT — PAIN SCALES - GENERAL: PAINLEVEL: NO PAIN (0)

## 2021-10-21 NOTE — PROGRESS NOTES
"SUBJECTIVE:   CC: Pierre Deleon is an 31 year old male who presents for preventative health visit.       Patient has been advised of split billing requirements and indicates understanding: Yes  Healthy Habits:     Getting at least 3 servings of Calcium per day:  Yes    Bi-annual eye exam:  NO    Dental care twice a year:  NO    Sleep apnea or symptoms of sleep apnea:  Excessive snoring    Diet:  Regular (no restrictions)    Frequency of exercise:  None    Taking medications regularly:  Yes    Medication side effects:  None    PHQ-2 Total Score: 0    Additional concerns today:  No    Patient is not fasting today.     Concerns for sleep apnea - snores and needs to be \"pushed awake\" her his wife. Stops breathing. He is sleepy a lot. Naps most weekends.    Weight questions.       Today's PHQ-2 Score:   PHQ-2 ( 1999 [employer]) 10/21/2021   Q1: Little interest or pleasure in doing things 0   Q2: Feeling down, depressed or hopeless 0   PHQ-2 Score 0   Q1: Little interest or pleasure in doing things Not at all   Q2: Feeling down, depressed or hopeless Not at all   PHQ-2 Score 0       Abuse: Current or Past(Physical, Sexual or Emotional)- No  Do you feel safe in your environment? Yes    Have you ever done Advance Care Planning? (For example, a Health Directive, POLST, or a discussion with a medical provider or your loved ones about your wishes): No, advance care planning information given to patient to review.  Patient declined advance care planning discussion at this time.    Social History     Tobacco Use     Smoking status: Current Every Day Smoker     Packs/day: 1.00     Types: Cigarettes     Smokeless tobacco: Never Used   Substance Use Topics     Alcohol use: No     If you drink alcohol do you typically have >3 drinks per day or >7 drinks per week? No    Alcohol Use 10/21/2021   Prescreen: >3 drinks/day or >7 drinks/week? No   Prescreen: >3 drinks/day or >7 drinks/week? -       Last PSA: No results found for: " "PSA    Reviewed orders with patient. Reviewed health maintenance and updated orders accordingly - Yes  Lab work is in process    Reviewed and updated as needed this visit by clinical staff  Tobacco  Allergies  Meds  Problems  Med Hx  Surg Hx  Fam Hx  Soc Hx          Reviewed and updated as needed this visit by Provider  Tobacco  Allergies  Meds  Problems  Med Hx  Surg Hx  Fam Hx             Review of Systems   Constitutional: Negative for chills and fever.   HENT: Negative for congestion, ear pain, hearing loss and sore throat.    Eyes: Negative for pain and visual disturbance.   Respiratory: Negative for cough and shortness of breath.    Cardiovascular: Negative for chest pain, palpitations and peripheral edema.   Gastrointestinal: Negative for abdominal pain, constipation, diarrhea, heartburn, hematochezia and nausea.   Genitourinary: Negative for discharge, dysuria, frequency, genital sores, hematuria, impotence and urgency.   Musculoskeletal: Negative for arthralgias, joint swelling and myalgias.   Skin: Negative for rash.   Neurological: Negative for dizziness, weakness, headaches and paresthesias.   Psychiatric/Behavioral: Negative for mood changes. The patient is not nervous/anxious.        OBJECTIVE:   /74 (BP Location: Right arm, Patient Position: Chair, Cuff Size: Adult Large)   Pulse 83   Temp 98.4  F (36.9  C) (Tympanic)   Resp 24   Ht 1.892 m (6' 2.49\")   Wt (!) 151.9 kg (334 lb 12.8 oz)   SpO2 98%   BMI 42.42 kg/m      Physical Exam  GENERAL: healthy, alert and no distress  EYES: Eyes grossly normal to inspection, PERRL and conjunctivae and sclerae normal  HENT: narrow/crowded oropharynx, ear canals and TM's normal, nose and mouth without ulcers or lesions  NECK: no adenopathy, no asymmetry, masses, or scars and thyroid normal to palpation  RESP: lungs clear to auscultation - no rales, rhonchi or wheezes  CV: regular rate and rhythm, normal S1 S2, no S3 or S4, no murmur, " "click or rub, no peripheral edema and peripheral pulses strong  ABDOMEN: soft, nontender, no hepatosplenomegaly, no masses and bowel sounds normal  MS: no gross musculoskeletal defects noted, no edema  NEURO: Normal strength and tone, mentation intact and speech normal  PSYCH: mentation appears normal, affect normal/bright  LYMPH: no cervical, supraclavicular, axillary, or inguinal adenopathy    Diagnostic Test Results:  Labs reviewed in Epic    ASSESSMENT/PLAN:   (Z00.00) Routine general medical examination at a health care facility  (primary encounter diagnosis)  Comment: Well person   Plan: Lipid panel reflex to direct LDL Non-fasting,         Basic metabolic panel  (Ca, Cl, CO2, Creat,         Gluc, K, Na, BUN), Hemoglobin A1c        Diet, exercise, wellness and other preventive recommendations related to health maintenance were discussed.  Follow up as needed for acute issues.  Physical exam in 1 year.     Smoking cessation advised.  Stop drinking soda    (Z13.220) Screening for hyperlipidemia  Comment:   Plan:     (E66.01) Morbid obesity (H)  Comment:   Plan: Comprehensive Weight Management, Hemoglobin A1c        Recommend evaluation by a weight management clinic     (R06.83) Snoring  Comment:   Plan: SLEEP EVALUATION & MANAGEMENT REFERRAL - ADULT         STOP BANG Positive for probably sleep apnea - refer    Patient has been advised of split billing requirements and indicates understanding: Yes  COUNSELING:   Reviewed preventive health counseling, as reflected in patient instructions    Estimated body mass index is 42.42 kg/m  as calculated from the following:    Height as of this encounter: 1.892 m (6' 2.49\").    Weight as of this encounter: 151.9 kg (334 lb 12.8 oz).         He reports that he has been smoking cigarettes. He has been smoking about 1.00 pack per day. He has never used smokeless tobacco.  Tobacco Cessation Action Plan:   Information offered: Patient not interested at this time      Counseling " Resources:  ATP IV Guidelines  Pooled Cohorts Equation Calculator  FRAX Risk Assessment  ICSI Preventive Guidelines  Dietary Guidelines for Americans, 2010  USDA's MyPlate  ASA Prophylaxis  Lung CA Screening    [facility] WR[facility]

## 2021-10-21 NOTE — PATIENT INSTRUCTIONS
1. Call sleep clinic  2. Weight management clinic calls you  (Both numbers included either way)     Preventive Health Recommendations  Male Ages 26 - 39    Yearly exam:             See your health care provider every year in order to  o   Review health changes.   o   Discuss preventive care.    o   Review your medicines if your doctor has prescribed any.    You should be tested each year for STDs (sexually transmitted diseases), if you re at risk.     After age 35, talk to your provider about cholesterol testing. If you are at risk for heart disease, have your cholesterol tested at least every 5 years.     If you are at risk for diabetes, you should have a diabetes test (fasting glucose).  Shots: Get a flu shot each year. Get a tetanus shot every 10 years.     Nutrition:    Eat at least 5 servings of fruits and vegetables daily.     Eat whole-grain bread, whole-wheat pasta and brown rice instead of white grains and rice.     Get adequate Calcium and Vitamin D.     Lifestyle    Exercise for at least 150 minutes a week (30 minutes a day, 5 days a week). This will help you control your weight and prevent disease.     Limit alcohol to one drink per day.     No smoking.     Wear sunscreen to prevent skin cancer.     See your dentist every six months for an exam and cleaning.

## 2021-10-22 LAB
ANION GAP SERPL CALCULATED.3IONS-SCNC: 6 MMOL/L (ref 3–14)
BUN SERPL-MCNC: 15 MG/DL (ref 7–30)
CALCIUM SERPL-MCNC: 9 MG/DL (ref 8.5–10.1)
CHLORIDE BLD-SCNC: 104 MMOL/L (ref 94–109)
CHOLEST SERPL-MCNC: 149 MG/DL
CO2 SERPL-SCNC: 28 MMOL/L (ref 20–32)
CREAT SERPL-MCNC: 0.91 MG/DL (ref 0.66–1.25)
FASTING STATUS PATIENT QL REPORTED: NO
GFR SERPL CREATININE-BSD FRML MDRD: >90 ML/MIN/1.73M2
GLUCOSE BLD-MCNC: 102 MG/DL (ref 70–99)
HDLC SERPL-MCNC: 33 MG/DL
LDLC SERPL CALC-MCNC: 62 MG/DL
NONHDLC SERPL-MCNC: 116 MG/DL
POTASSIUM BLD-SCNC: 4.4 MMOL/L (ref 3.4–5.3)
SODIUM SERPL-SCNC: 138 MMOL/L (ref 133–144)
TRIGL SERPL-MCNC: 271 MG/DL

## 2021-10-27 ENCOUNTER — MYC MEDICAL ADVICE (OUTPATIENT)
Dept: FAMILY MEDICINE | Facility: CLINIC | Age: 31
End: 2021-10-27

## 2021-10-27 DIAGNOSIS — Z13.220 SCREENING FOR HYPERLIPIDEMIA: Primary | ICD-10-CM

## 2021-10-27 NOTE — TELEPHONE ENCOUNTER
REFERRAL INFORMATION    Referring Provider:  Macho Lopez PA-C     Referring Clinic:  [facility]     Reason for Visit/Diagnosis: New Surg, BMI 42        FUTURE VISIT INFORMATION:    Appointment Date: 12/9/2021    Appointment Time: 10:45 AM      NOTES RECORD STATUS  DETAILS   OFFICE NOTE from Referring Provider Internal 10/21/2021 Office visit with Dinh Lopez PA-C     OFFICE NOTE from Other Specialists N/A    HOSPITAL DISCHARGE SUMMARY/ ED VISITS  N/A    OPERATIVE REPORT N/A    ENDOSCOPY (EGD)  N/A    PERTINENT LABS Internal    PATHOLOGY REPORTS (RELATED) N/A    IMAGING (CT, MRI, US, XR)  N/A

## 2021-10-28 NOTE — TELEPHONE ENCOUNTER
Please see [employer] message re: 10/21/21 lab results.     Result note not yet provided.    Routed to PCP to review and advise.     Kate Deleon RN, BSN, PHN  [facility] [place]

## 2021-10-28 NOTE — TELEPHONE ENCOUNTER
Patient requesting labs to be ordered to recheck triglycerides .     Routing to PCP to place future lab orders.     Kate Deleon RN, BSN, PHN  [facility]: [place]

## 2021-11-12 ENCOUNTER — LAB (OUTPATIENT)
Dept: LAB | Facility: CLINIC | Age: 31
End: 2021-11-12
Payer: COMMERCIAL

## 2021-11-12 DIAGNOSIS — Z11.4 SCREENING FOR HIV (HUMAN IMMUNODEFICIENCY VIRUS): ICD-10-CM

## 2021-11-12 DIAGNOSIS — Z13.220 SCREENING FOR HYPERLIPIDEMIA: ICD-10-CM

## 2021-11-12 DIAGNOSIS — Z11.59 NEED FOR HEPATITIS C SCREENING TEST: ICD-10-CM

## 2021-11-12 LAB
HCV AB SERPL QL IA: NONREACTIVE
HIV 1+2 AB+HIV1 P24 AG SERPL QL IA: NONREACTIVE

## 2021-11-12 PROCEDURE — 86803 HEPATITIS C AB TEST: CPT

## 2021-11-12 PROCEDURE — 36415 COLL VENOUS BLD VENIPUNCTURE: CPT

## 2021-11-12 PROCEDURE — 87389 HIV-1 AG W/HIV-1&-2 AB AG IA: CPT

## 2021-11-12 PROCEDURE — 80061 LIPID PANEL: CPT

## 2021-11-13 LAB
CHOLEST SERPL-MCNC: 161 MG/DL
FASTING STATUS PATIENT QL REPORTED: YES
HDLC SERPL-MCNC: 38 MG/DL
LDLC SERPL CALC-MCNC: 102 MG/DL
NONHDLC SERPL-MCNC: 123 MG/DL
TRIGL SERPL-MCNC: 104 MG/DL

## 2021-12-01 ENCOUNTER — TELEPHONE (OUTPATIENT)
Dept: ENDOCRINOLOGY | Facility: CLINIC | Age: 31
End: 2021-12-01
Payer: COMMERCIAL

## 2021-12-01 NOTE — TELEPHONE ENCOUNTER
Pt has been left two voicemails now regarding appt cancellation for Lilia Morales on 12/9 due to provider unavailable. Also notified pt that RD visit Gabriel You also needs to be rescheduled since he is a new patient and needs to see [facility]. Sent mychart and mailed letter. R/S to next available.

## 2021-12-09 ENCOUNTER — PRE VISIT (OUTPATIENT)
Dept: ENDOCRINOLOGY | Facility: CLINIC | Age: 31
End: 2021-12-09

## 2021-12-27 PROBLEM — E66.01 MORBID OBESITY (H): Chronic | Status: ACTIVE | Noted: 2021-10-21

## 2021-12-28 ENCOUNTER — VIRTUAL VISIT (OUTPATIENT)
Dept: SLEEP MEDICINE | Facility: CLINIC | Age: 31
End: 2021-12-28
Attending: PHYSICIAN ASSISTANT
Payer: COMMERCIAL

## 2021-12-28 VITALS — WEIGHT: 300 LBS | BODY MASS INDEX: 38.5 KG/M2 | HEIGHT: 74 IN

## 2021-12-28 DIAGNOSIS — R53.81 MALAISE AND FATIGUE: ICD-10-CM

## 2021-12-28 DIAGNOSIS — Z72.820 LACK OF ADEQUATE SLEEP: ICD-10-CM

## 2021-12-28 DIAGNOSIS — G47.30 SLEEP APNEA, UNSPECIFIED TYPE: ICD-10-CM

## 2021-12-28 DIAGNOSIS — R06.83 SNORING: ICD-10-CM

## 2021-12-28 DIAGNOSIS — R06.89 DYSPNEA AND RESPIRATORY ABNORMALITY: Primary | ICD-10-CM

## 2021-12-28 DIAGNOSIS — E66.09 CLASS 2 OBESITY DUE TO EXCESS CALORIES WITH BODY MASS INDEX (BMI) OF 38.0 TO 38.9 IN ADULT, UNSPECIFIED WHETHER SERIOUS COMORBIDITY PRESENT: ICD-10-CM

## 2021-12-28 DIAGNOSIS — R53.83 MALAISE AND FATIGUE: ICD-10-CM

## 2021-12-28 DIAGNOSIS — E66.812 CLASS 2 OBESITY DUE TO EXCESS CALORIES WITH BODY MASS INDEX (BMI) OF 38.0 TO 38.9 IN ADULT, UNSPECIFIED WHETHER SERIOUS COMORBIDITY PRESENT: ICD-10-CM

## 2021-12-28 DIAGNOSIS — R06.00 DYSPNEA AND RESPIRATORY ABNORMALITY: Primary | ICD-10-CM

## 2021-12-28 PROCEDURE — 99203 OFFICE O/P NEW LOW 30 MIN: CPT | Mod: 95 | Performed by: PHYSICIAN ASSISTANT

## 2021-12-28 RX ORDER — ZOLPIDEM TARTRATE 5 MG/1
TABLET ORAL
Qty: 1 TABLET | Refills: 0 | Status: SHIPPED | OUTPATIENT
Start: 2021-12-28

## 2021-12-28 ASSESSMENT — SLEEP AND FATIGUE QUESTIONNAIRES
HOW LIKELY ARE YOU TO NOD OFF OR FALL ASLEEP WHILE LYING DOWN TO REST IN THE AFTERNOON WHEN CIRCUMSTANCES PERMIT: SLIGHT CHANCE OF DOZING
HOW LIKELY ARE YOU TO NOD OFF OR FALL ASLEEP WHILE WATCHING TV: SLIGHT CHANCE OF DOZING
HOW LIKELY ARE YOU TO NOD OFF OR FALL ASLEEP WHEN YOU ARE A PASSENGER IN A CAR FOR AN HOUR WITHOUT A BREAK: WOULD NEVER DOZE
HOW LIKELY ARE YOU TO NOD OFF OR FALL ASLEEP WHILE SITTING QUIETLY AFTER LUNCH WITHOUT ALCOHOL: MODERATE CHANCE OF DOZING
HOW LIKELY ARE YOU TO NOD OFF OR FALL ASLEEP WHILE SITTING INACTIVE IN A PUBLIC PLACE: WOULD NEVER DOZE
HOW LIKELY ARE YOU TO NOD OFF OR FALL ASLEEP IN A CAR, WHILE STOPPED FOR A FEW MINUTES IN TRAFFIC: WOULD NEVER DOZE
HOW LIKELY ARE YOU TO NOD OFF OR FALL ASLEEP WHILE SITTING AND TALKING TO SOMEONE: WOULD NEVER DOZE
HOW LIKELY ARE YOU TO NOD OFF OR FALL ASLEEP WHILE SITTING AND READING: SLIGHT CHANCE OF DOZING

## 2021-12-28 ASSESSMENT — MIFFLIN-ST. JEOR: SCORE: 2395.54

## 2021-12-28 NOTE — PROGRESS NOTES
Pierre is a 31 year old who is being evaluated via a billable video visit.      How would you like to obtain your AVS? MyChart  If the video visit is dropped, the invitation should be resent by: Text to cell phone: [phone]  Will anyone else be joining your video visit? Yes: bi Timmons. How would they like to receive their invitation? Text to cell phone: same      Video Start Time: 3:31PM  Video-Visit Details    Type of service:  Video Visit    Video End Time:3:57 PM    Originating Location (pt. Location): Home    Distant Location (provider location):  [facility]     Platform used for Video Visit: [employer]       Sleep Consultation:    Date on this visit: 12/28/2021    Pierre Deleon is sent by Dinh Lopez for a sleep consultation regarding snoring.    Primary Physician: Dinh Lopez     Chief Complaint   Patient presents with     Video Visit     sleep apnea symptoms     Pierre Deleon is a 31 year old male with history remarkable for BMI of 38.     Pierre goes to sleep at 9:00 PM during the week. He wakes up at 4:00 AM with an alarm. He falls asleep in 30 minutes.  Pierre denies difficulty falling asleep.  He wakes up 1-2 times a night for 5-60 minutes before falling back to sleep.  Pierre wakes up to go to the bathroom.  On weekends, Pierre goes to sleep at 11:00 PM.  He wakes up at 8:00 AM without an alarm. He falls asleep in 30 minutes.  Patient gets 5-6 hours of sleep per night.     Patient does not watch TV in bed.     Pierre does not do shift work.  He is a .     Pierre does snore every night and snoring is loud. Patient does have a regular bed partner. There is report of snoring and gasping.  He does have witnessed apneas. They never sleep separately.  Patient sleeps on his side. He denies no morning headaches, morning confusion and restless legs. Pierre denies any bruxism, sleep walking, sleep talking, dream enactment, sleep paralysis,  cataplexy and hypnogogic/hypnopompic hallucinations.    Pierre denies difficulty breathing through his nose.      Pierre has gained 0-5 pounds in the last year.  Patient describes themself as a morning person.  He would prefer to go to sleep at 10:00 PM and wake up at 9:00 AM.  Patient's [place] Sleepiness score 5/24 inconsistent with excessive  daytime sleepiness. He reports fatigue.      Pierre naps 1-2 times per week for  minutes, feels unrefreshed after naps. He takes some inadvertant naps.  He denies falling asleep while driving.  Patient was counseled on the importance of driving while alert, to pull over if drowsy, or nap before getting into the vehicle if sleepy.  He uses 2 sodas/day. Last caffeine intake is usually before 8 PM.    Allergies:    No Known Allergies    Medications:    No current outpatient medications on file.       Problem List:  Patient Active Problem List    Diagnosis Date Noted     Morbid obesity (H) 10/21/2021     Priority: Medium     Tobacco abuse 03/08/2017     Priority: Medium     CARDIOVASCULAR SCREENING; LDL GOAL LESS THAN 160      Priority: Medium     Major depressive disorder, single episode, moderate (H) 01/12/2009     Priority: Medium        Past Medical/Surgical History:  Past Medical History:   Diagnosis Date     Depression      Past Surgical History:   Procedure Laterality Date     NO HISTORY OF SURGERY         Social History:  Social History     Socioeconomic History     Marital status: Single     Spouse name: Not on file     Number of children: Not on file     Years of education: Not on file     Highest education level: Not on file   Occupational History     Not on file   Tobacco Use     Smoking status: Current Every Day Smoker     Packs/day: 1.00     Types: Cigarettes, Vaping Device     Smokeless tobacco: Never Used   Substance and Sexual Activity     Alcohol use: Yes     Comment: Social     Drug use: No     Sexual activity: Yes     Partners: Female   Other Topics  "Concern     Parent/sibling w/ CABG, MI or angioplasty before 65F 55M? No     Comment: unknown   Social History Narrative     Not on file     Social Determinants of Health     Financial Resource Strain: Not on file   Food Insecurity: Not on file   Transportation Needs: Not on file   Physical Activity: Not on file   Stress: Not on file   Social Connections: Not on file   Intimate Partner Violence: Not on file   Housing Stability: Not on file       Family History:  Family History   Adopted: Yes   Problem Relation Age of Onset     Diabetes Father      Coronary Artery Disease Father        Review of Systems:  Answers for HPI/ROS submitted by the patient on 12/28/2021  General Symptoms: No  Skin Symptoms: No  HENT Symptoms: No  EYE SYMPTOMS: No  HEART SYMPTOMS: No  LUNG SYMPTOMS: No  INTESTINAL SYMPTOMS: No  URINARY SYMPTOMS: No  REPRODUCTIVE SYMPTOMS: No  SKELETAL SYMPTOMS: No  BLOOD SYMPTOMS: No  NERVOUS SYSTEM SYMPTOMS: No  MENTAL HEALTH SYMPTOMS: No      Physical Examination:  Vitals: Ht 1.88 m (6' 2\")   Wt 136.1 kg (300 lb)   BMI 38.52 kg/m    BMI= Body mass index is 38.52 kg/m .         [place] Total Score 12/28/2021   Total score - [place] 5       JANET Total Score: 6 (12/28/21 1505)    GENERAL APPEARANCE: alert and no distress  EYES: Eyes grossly normal to inspection  RESP: breathing is non-labored  NEURO: mentation intact and speech normal  PSYCH: affect normal/bright    Last Comprehensive Metabolic Panel:  Sodium   Date Value Ref Range Status   10/21/2021 138 133 - 144 mmol/L Final   11/16/2006 142 133 - 143 mmol/L Final     Potassium   Date Value Ref Range Status   10/21/2021 4.4 3.4 - 5.3 mmol/L Final   11/16/2006 4.1 3.4 - 5.3 mmol/L Final     Chloride   Date Value Ref Range Status   10/21/2021 104 94 - 109 mmol/L Final   11/16/2006 105 98 - 110 mmol/L Final     Carbon Dioxide   Date Value Ref Range Status   11/16/2006 27 20 - 32 mmol/L Final     Carbon Dioxide (CO2)   Date Value Ref Range Status   10/21/2021 " 28 20 - 32 mmol/L Final     Anion Gap   Date Value Ref Range Status   10/21/2021 6 3 - 14 mmol/L Final   11/16/2006 11 6 - 17 mmol/L Final     Glucose   Date Value Ref Range Status   10/21/2021 102 (H) 70 - 99 mg/dL Final   08/01/2012 94 60 - 99 mg/dL Final     Urea Nitrogen   Date Value Ref Range Status   10/21/2021 15 7 - 30 mg/dL Final   11/16/2006 13 5 - 24 mg/dL Final     Creatinine   Date Value Ref Range Status   10/21/2021 0.91 0.66 - 1.25 mg/dL Final   11/16/2006 1.02 0.60 - 1.20 mg/dL Final     GFR Estimate   Date Value Ref Range Status   10/21/2021 >90 >60 mL/min/1.73m2 Final     Comment:     As of July 11, 2021, eGFR is calculated by the CKD-EPI creatinine equation, without race adjustment. eGFR can be influenced by muscle mass, exercise, and diet. The reported eGFR is an estimation only and is only applicable if the renal function is stable.   11/16/2006 GFR not calculated, patient <16 years old. mL/min/1.7m2 Final     Calcium   Date Value Ref Range Status   10/21/2021 9.0 8.5 - 10.1 mg/dL Final   11/16/2006 9.0 8.7 - 10.8 mg/dL Final     TSH   Date Value Ref Range Status   02/16/2012 1.32 0.4 - 5.0 mU/L Final       Impression:  Patient has features and risk factors for possible obstructive sleep apnea including: BMI of 38, loud snoring, witnessed apnea, non-refreshing sleep,  daytime fatigue and difficulty maintaining sleep. The STOP-BANG score is 5/8.  The pathophysiology, diagnosis and treatment of SANDRA was discussed.  Plan:    1. Polysomnogram (using 4% desaturation/Medicare/ AASM 1B scoring rules) for high probability obstructive sleep apnea.   2. Advised him against drowsy driving.    3. Recommend weight management.     Literature provided regarding sleep apnea.      He will follow up with me in approximately two weeks after his sleep study has been completed to review the results and discuss plan of care.       Polysomnography reviewed.  Obstructive sleep apnea reviewed.  Complications of untreated  sleep apnea were reviewed.    Britney Watts PA-C    CC: Dinh Lopez

## 2021-12-28 NOTE — PATIENT INSTRUCTIONS
Your BMI is Body mass index is 38.52 kg/m .  Weight management is a personal decision.  If you are interested in exploring weight loss strategies, the following discussion covers the approaches that may be successful. Body mass index (BMI) is one way to tell whether you are at a healthy weight, overweight, or obese. It measures your weight in relation to your height.  A BMI of 18.5 to 24.9 is in the healthy range. A person with a BMI of 25 to 29.9 is considered overweight, and someone with a BMI of 30 or greater is considered obese. More than two-thirds of American adults are considered overweight or obese.  Being overweight or obese increases the risk for further weight gain. Excess weight may lead to heart disease and diabetes.  Creating and following plans for healthy eating and physical activity may help you improve your health.  Weight control is part of healthy lifestyle and includes exercise, emotional health, and healthy eating habits. Careful eating habits lifelong are the mainstay of weight control. Though there are significant health benefits from weight loss, long-term weight loss with diet alone may be very difficult to achieve- studies show long-term success with dietary management in less than 10% of people. Attaining a healthy weight may be especially difficult to achieve in those with severe obesity. In some cases, medications, devices and surgical management might be considered.  What can you do?  If you are overweight or obese and are interested in methods for weight loss, you should discuss this with your provider.     Consider reducing daily calorie intake by 500 calories.     Keep a food journal.     Avoiding skipping meals, consider cutting portions instead.    Diet combined with exercise helps maintain muscle while optimizing fat loss. Strength training is particularly important for building and maintaining muscle mass. Exercise helps reduce stress, increase energy, and improves fitness.  Increasing exercise without diet control, however, may not burn enough calories to loose weight.       Start walking three days a week 10-20 minutes at a time    Work towards walking thirty minutes five days a week     Eventually, increase the speed of your walking for 1-2 minutes at time    In addition, we recommend that you review healthy lifestyles and methods for weight loss available through the National Institutes of Health patient information sites:  https://www.nhlbi.nih.gov/health-topics/overweight-and-obesity    And look into health and wellness programs that may be available through your health insurance provider, employer, local community center, or jamir club.    Weight management plan: Patient was referred to their PCP to discuss a diet and exercise plan.  MY CONTACT NUMBERS ARE: [phone]  DOCTOR : LEX Jorge  SLEEP CENTER :   CPAP EQUIPMENT :    IF I HAVE SLEEP APNEA.....  WHERE CAN I FIND MORE INFORMATION?    American Academy of Sleep Medicine Patient information on sleep disorders:  http://yoursleep.aasmnet.org    THINGS TO REMEMBER  In most situations, sleep apnea is a lifelong disease that must be managed with daily therapy. Untreated disease, when severe, may result in an increased risk for an array of problems from heart disease to mood changes, car accidents and shorter lifespan.    CPAP -  WHY AND HOW?  Continuous positive airway pressure, or CPAP, is the most effective treatment for obstructive sleep apnea. A decision to use CPAP is a major step forward in the pursuit of a healthier life. The successful use of CPAP will help you breathe easier, sleep better and live healthier. Using CPAP can be a positive experience if you keep these kennedy points in mind:  1. Commitment  CPAP is not a quick fix for your problem. It involves a long-term commitment to improve your sleep and your health.    2. Communication  Stay in close communication with both your sleep doctor and your CPAP  "supplier. Ask lots of questions and seek help when you need it.    3. Consistency  Use CPAP all night, every night and for every nap. You will receive the maximum health benefits from CPAP when you use it every time that you sleep. This will also make it easier for your body to adjust to the treatment.    4. Correction  The first machine and mask that you try may not be the best ones for you. Work with your sleep doctor and your CPAP supplier to make corrections to your equipment selection. Ask about trying a different type of machine or mask if you have ongoing problems. Make sure that your mask is a good fit and learn to use your equipment properly.    5. Challenge  Tell a family member or close friend to ask you each morning if you used your CPAP the previous night. Have someone to challenge you to give it your best effort.    6. Connection   Your adjustment to CPAP will be easier if you are able to connect with others who use the same treatment. Ask your sleep doctor if there is a support group in your area for people who have sleep apnea, or look for one on the Internet.    7. Comfort   Increase your level of comfort by using a saline spray, decongestant or heated humidifier if CPAP irritates your nose, mouth or throat. Use your unit's \"ramp\" setting to slowly get used to the air pressure level. There may be soft pads you can buy that will fit over your mask straps. Look on www.CPAP.com for accessories such as these straps, a pillow contoured for side-sleeping with CPAP, longer hoses, hose covers to reduce condensation, or stands to keep the hose out of your way.                                 8. Cleaning   Clean your mask, tubing and headgear on a regular basis. Put this time in your schedule so that you don't forget to do it. Check and replace the filters for your CPAP unit and humidifier.    9. Completion   Although you are never finished with CPAP therapy, you should reward yourself by celebrating the " completion of your first month of treatment. Expect this first month to be your hardest period of adjustment. It will involve some trial and error as you find the machine, mask and pressure settings that are right for you.    Continuation  After your first month of treatment, continue to make a daily commitment to use your CPAP all night, every night and for every nap.    CPAP-Tips to starting with success:  Begin using your CPAP for short periods of time during the day while you watch TV or read.    Use CPAP every night and for every nap. Using it less often reduces the health benefits and makes it harder for your body to get used to it.    Newer CPAP models are virtually silent; however, if you find the sound of your CPAP machine to be bothersome, place the unit under your bed to dampen the sound.     Make small adjustments to your mask, tubing, straps and headgear until you get the right fit. Tightening the mask may actually worsen the leak.  If it leaves significant marks on your face or irritates the bridge of your nose, it may not be the best mask for you.  Speak with the person who supplied the mask and consider trying other masks.    Use a saline nasal spray to ease mild nasal congestion. Neti-Pot or saline nasal rinses may also help. Nasal gel sprays can help reduce nasal dryness.  Biotene mouthwash can be helpful to protect your teeth if you experience frequent dry mouth.  Dry mouth may be a sign of air escaping out of your mouth or out of the mask in the case of a full face mask.  Speak with your provider if you expect that is the case.     Take a nasal decongestant to relieve more severe nasal or sinus congestion.  Do not use Afrin (oxymetazoline) nasal spray more than 3 days in a row.  Speak with your sleep doctor if your nasal congestion is chronic.    Use a heated humidifier that fits your CPAP model to enhance your breathing comfort. Adjust the heat setting up if you get a dry nose or throat, down if  you get condensation in the hose or mask.  Position the CPAP lower than you so that any condensation in the hose drains back into the machine rather than towards the mask.    Try a system that uses nasal pillows if traditional masks give you problems.    Clean your mask, tubing and headgear once a week. Make sure the equipment dries fully.    Regularly check and replace the filters for your CPAP unit and humidifier.    Work closely with your sleep doctor and your CPAP supplier to make sure that you have the machine, mask and air pressure setting that works best for you.    BESIDES CPAP, WHAT OTHER THERAPIES ARE THERE?    Postioning devices if you only have the problem on your back    Dental devices if your condition is mild    Nasal valves may be effective though experience is limited    Tongue Retaining Device if missing teeth precludes the use of a dental device    Weight loss if you are overweight    Surgery in limited cases where devices are not acceptable or there are problems with structures in the nose and throat  If treated with one of these alternative options, further evaluation is necessary to ensure that the therapy is effective. This may require some form of testing.     Healthy Lifestyle:  Healthy diet, exercise and Limit alcohol: Not only will excessive alcohol increase your weight over time, but it irritates the throat tissues and make them swell, shrinking the airway and causing snoring. Drinking alcohol should be limited and stopped within 3-4 hours before going to bed.   Stop smoking: (Red swollen throat, heat, nicotine), also irritates and swells the airway, among numerous other negative health consequences.    Positioning Device  This example shows a pillow that straps around the waist. It may be appropriate for those whose sleep study shows milder sleep apnea that occurs primarily when lying flat on one's back. Preliminary studies have shown benefit but effectiveness at home should be  verified.    Nasal Valves              Nasal valves may not be effective if you have frequent nasal congestion or have difficulty breathing through your nose. They may be an option for mild apnea if other options are not well tolerated. The efficacy of these devices is generally less than CPAP or oral appliances.  Oral Appliance  These are examples of two of many custom-made devices that are more likely to work in mild sleep apnea  Oral appliances are dental mouth pieces that fit very much like a sports mouth guards or removable orthodontic retainers. They are used to treat snoring  And obstructive sleep apnea . The device prevents the airway from collapsing by either holding the tongue or supporting the jaw in a forward position. Since oral appliances are non-invasive and easy to use, they may be considered as an early treatment option. Oral appliance therapy (OAT) involves the customization, selection, fabrication, fitting, adjustments and long-term follow-up care of specially designed oral devices, worn during sleep, which reposition the lower jaw and tongue base forward to maintain an open airway.  Custom made oral appliances are proven to be more effective than over-the-counter devices. Therefore, the over-the-counter devices are recommended not to be used as a screening tool nor as a therapeutic option.  Who gets a dental device?  Oral appliance therapy can be used as an alternative to  CPAP therapy for the treatment of mild to moderate sleep apnea and for those patients who prefer OAT to CPAP. Oral appliance therapy is a first line therapy for the treatment of primary snoring. Additionally, OAT is an option for those that cannot tolerate CPAP as therapy or who have experienced insufficient surgical results.    Possible side effects?  Frequent but minor side effects include: excessive salivation, dry mouth, discomfort of teeth and jaw and temporary changes in the patient s bite.  Potential complications  include: jaw pain, permanent occlusal changes and TMJ symptoms.  The above mentioned side effects and complications can be recognized and managed by dentists trained in dental sleep medicine.    Finding a dentist that practices dental sleep medicine  Specific training is available through the American Academy of Dental Sleep Medicine for dentists interested in working in the field of sleep. To find a dentist who is educated in the field of sleep and the use of oral appliances, near you, visit the Web site of the American Academy of Dental Sleep Medicine; also see http://www.accpstorage.org/newOrganization/patients/oralAppliances.pdf   To search for a dentist certified in these practices:  Http://aadsm.org/FindADentist.aspx?1  Http://www.accpstorage.org/newOrganization/patients/oralAppliances.pdf    Tongue Retaining Device               Tongue Retaining Devices are devices that generally  suction cup  onto the tongue preventing it from falling back into the back of the throat during sleep.  They may be an option for people missing teeth, but can be uncomfortable. This particular device can be purchased online, but similar devices made by dentists fit more precisely and may be tolerated better. In general, they are rarely effective and often not very well tolerated.    Weight Loss:  Some patients may experience reduction or elimination of sleep apnea with weight loss.  Though there are significant health benefits from weight loss, long-term weight loss is very difficult to achieve- studies show success with dietary management in less that 10% of people.     If you are interested in dietary weight loss, you should review the options discussed at the National Institutes of Health patient information site:     Http:/www.health.nih.gov/topic/WeightLossDieting    Bariatric programs offer counseling in all methods of weight  "loss:    Http:/www.[facility].org/Specialties/WeightLossSurgeryandMedicalMgmt/htm    Surgery:  There are a number of surgeries that have been attempted to treat apnea. In general, surgical options are usually reserved for cases in which there is a physical abnormality contributing to obstruction or other treatment options are ineffective or not tolerated. Most surgical options are either unreliable or quite invasive. One of the more common procedures is:  Uvulopalatopharyngoplasty: In this procedure, the uvula (the finger-like tissue that hangs in the back of the throat), part of the soft palate (the tissue that the uvula is attached to), and sometimes the tonsils or adenoids are removed. The efficacy of this surgery is around 30-50% .  After surgery, complications may include:  Sleepiness and sleep apnea related to post-surgery medication   Swelling, infection and bleeding   A sore throat and/or difficulty swallowing   Drainage of secretions into the nose and a nasal quality to the voice. English language speech does not seem to be affected by this surgery.   Narrowing of the airway in the nose and throat (hence constricting breathing) snoring and even iatrogenically caused sleep apnea. By cutting the tissues, excess scar tissue can \"tighten\" the airway and make it even smaller than it was before UPPP.  Patients who have had the uvula removed will become unable to correctly speak [place] or any other language that has a uvular 'r' phoneme.    Surgeries to help resolve nasal congestion may help reduce the severity of apnea slightly. Nasal congestion does not cause apnea on its own, so these surgeries are usually not performed just for SANDRA.  They may be worth considering if the nasal congestion is significantly bothersome independent of apnea.              "

## 2022-01-03 NOTE — PROGRESS NOTES
Patient has been contacted to schedule a sleep study per providers orders. A message was left for patient to contact the sleep clinic to schedule sleep testing and follow up appointment.   Marilyn Diamond MA

## 2022-11-20 ENCOUNTER — HEALTH MAINTENANCE LETTER (OUTPATIENT)
Age: 32
End: 2022-11-20

## 2022-12-24 ENCOUNTER — HEALTH MAINTENANCE LETTER (OUTPATIENT)
Age: 32
End: 2022-12-24

## 2024-02-03 ENCOUNTER — HEALTH MAINTENANCE LETTER (OUTPATIENT)
Age: 34
End: 2024-02-03